# Patient Record
Sex: MALE | Race: BLACK OR AFRICAN AMERICAN
[De-identification: names, ages, dates, MRNs, and addresses within clinical notes are randomized per-mention and may not be internally consistent; named-entity substitution may affect disease eponyms.]

---

## 2020-08-10 ENCOUNTER — HOSPITAL ENCOUNTER (INPATIENT)
Dept: HOSPITAL 54 - ER | Age: 85
LOS: 4 days | Discharge: NURSING, REHAB OR LONG-TERM CARE | DRG: 564 | End: 2020-08-14
Attending: LEGAL MEDICINE | Admitting: LEGAL MEDICINE
Payer: MEDICARE

## 2020-08-10 VITALS — HEIGHT: 72 IN | WEIGHT: 156 LBS | BODY MASS INDEX: 21.13 KG/M2

## 2020-08-10 VITALS — SYSTOLIC BLOOD PRESSURE: 113 MMHG | DIASTOLIC BLOOD PRESSURE: 80 MMHG

## 2020-08-10 DIAGNOSIS — Z51.5: ICD-10-CM

## 2020-08-10 DIAGNOSIS — E11.69: ICD-10-CM

## 2020-08-10 DIAGNOSIS — Z91.19: ICD-10-CM

## 2020-08-10 DIAGNOSIS — Z79.4: ICD-10-CM

## 2020-08-10 DIAGNOSIS — E78.5: ICD-10-CM
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
## 2020-08-10 DIAGNOSIS — I25.10: ICD-10-CM

## 2020-08-10 DIAGNOSIS — E11.42: ICD-10-CM

## 2020-08-10 DIAGNOSIS — Z79.02: ICD-10-CM

## 2020-08-10 DIAGNOSIS — E11.22: ICD-10-CM

## 2020-08-10 DIAGNOSIS — E11.621: ICD-10-CM

## 2020-08-10 DIAGNOSIS — E11.628: ICD-10-CM

## 2020-08-10 DIAGNOSIS — L97.529: ICD-10-CM

## 2020-08-10 DIAGNOSIS — A41.9: ICD-10-CM

## 2020-08-10 DIAGNOSIS — E11.65: ICD-10-CM

## 2020-08-10 DIAGNOSIS — I12.9: ICD-10-CM

## 2020-08-10 DIAGNOSIS — N18.9: ICD-10-CM

## 2020-08-10 DIAGNOSIS — F32.9: ICD-10-CM

## 2020-08-10 DIAGNOSIS — J98.11: ICD-10-CM

## 2020-08-10 DIAGNOSIS — Z79.82: ICD-10-CM

## 2020-08-10 DIAGNOSIS — T87.44: Primary | ICD-10-CM

## 2020-08-10 DIAGNOSIS — M86.9: ICD-10-CM

## 2020-08-10 DIAGNOSIS — Z79.899: ICD-10-CM

## 2020-08-10 DIAGNOSIS — F41.9: ICD-10-CM
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
## 2020-08-10 LAB
ALBUMIN SERPL BCP-MCNC: 3.1 G/DL (ref 3.4–5)
ALP SERPL-CCNC: 76 U/L (ref 46–116)
ALT SERPL W P-5'-P-CCNC: 10 U/L (ref 12–78)
AST SERPL W P-5'-P-CCNC: 12 U/L (ref 15–37)
BASOPHILS # BLD AUTO: 0 /CMM (ref 0–0.2)
BASOPHILS NFR BLD AUTO: 0.3 % (ref 0–2)
BILIRUB DIRECT SERPL-MCNC: 0.1 MG/DL (ref 0–0.2)
BILIRUB SERPL-MCNC: 0.3 MG/DL (ref 0.2–1)
BUN SERPL-MCNC: 13 MG/DL (ref 7–18)
CALCIUM SERPL-MCNC: 8.8 MG/DL (ref 8.5–10.1)
CHLORIDE SERPL-SCNC: 100 MMOL/L (ref 98–107)
CO2 SERPL-SCNC: 28 MMOL/L (ref 21–32)
CREAT SERPL-MCNC: 0.8 MG/DL (ref 0.6–1.3)
EOSINOPHIL NFR BLD AUTO: 1.9 % (ref 0–6)
GLUCOSE SERPL-MCNC: 224 MG/DL (ref 74–106)
HCT VFR BLD AUTO: 34 % (ref 39–51)
HGB BLD-MCNC: 10.7 G/DL (ref 13.5–17.5)
LYMPHOCYTES NFR BLD AUTO: 1.3 /CMM (ref 0.8–4.8)
LYMPHOCYTES NFR BLD AUTO: 14.9 % (ref 20–44)
MCHC RBC AUTO-ENTMCNC: 32 G/DL (ref 31–36)
MCV RBC AUTO: 82 FL (ref 80–96)
MONOCYTES NFR BLD AUTO: 0.4 /CMM (ref 0.1–1.3)
MONOCYTES NFR BLD AUTO: 4.5 % (ref 2–12)
NEUTROPHILS # BLD AUTO: 7 /CMM (ref 1.8–8.9)
NEUTROPHILS NFR BLD AUTO: 78.4 % (ref 43–81)
PLATELET # BLD AUTO: 344 /CMM (ref 150–450)
POTASSIUM SERPL-SCNC: 4.5 MMOL/L (ref 3.5–5.1)
PROT SERPL-MCNC: 7.7 G/DL (ref 6.4–8.2)
RBC # BLD AUTO: 4.11 MIL/UL (ref 4.5–6)
SODIUM SERPL-SCNC: 135 MMOL/L (ref 136–145)
WBC NRBC COR # BLD AUTO: 9 K/UL (ref 4.3–11)

## 2020-08-10 PROCEDURE — C9113 INJ PANTOPRAZOLE SODIUM, VIA: HCPCS

## 2020-08-10 PROCEDURE — A6407 PACKING STRIPS, NON-IMPREG: HCPCS

## 2020-08-10 PROCEDURE — A6403 STERILE GAUZE>16 <= 48 SQ IN: HCPCS

## 2020-08-10 PROCEDURE — A6253 ABSORPT DRG > 48 SQ IN W/O B: HCPCS

## 2020-08-10 PROCEDURE — A4217 STERILE WATER/SALINE, 500 ML: HCPCS

## 2020-08-10 PROCEDURE — G0378 HOSPITAL OBSERVATION PER HR: HCPCS

## 2020-08-10 RX ADMIN — ATORVASTATIN CALCIUM SCH MG: 10 TABLET, FILM COATED ORAL at 22:35

## 2020-08-10 RX ADMIN — Medication SCH MG: at 22:36

## 2020-08-10 RX ADMIN — SODIUM CHLORIDE PRN MLS/HR: 9 INJECTION, SOLUTION INTRAVENOUS at 23:16

## 2020-08-10 RX ADMIN — INSULIN GLARGINE SCH UNIT: 100 INJECTION, SOLUTION SUBCUTANEOUS at 22:51

## 2020-08-10 RX ADMIN — Medication PRN EACH: at 23:21

## 2020-08-10 NOTE — NUR
pt transported to unit on [place] with emt and rn at bedside. using [facility] 
protocol. nad noted during transport.

## 2020-08-10 NOTE — NUR
MS RN NOTE

PAGED MD GOMEZ REGARDING PT REFUSING ALL CARE. REFUSING TO ANSWER THE ADMISSION 
QUESTIONS. STATES "I WILL DO TOMORROW MORNING, LEAVE ME ALONE AT THIS TIME. NURSE TYSON 
UNABLE TO DO WOUND ASSESSMENT AND ADMISSION QUESTIONNAIRES. WAITING FOR MD TO CALL BACK.
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
## 2020-08-10 NOTE — NUR
RN OPENING NOTE





PT RECEIVED IN STABLE CONDITION. PT REFUSES TO PROVIDE ANY INFORMATION AND HE REFUSES TO BE 
TOUCHED.HE STATED "GO OUT OF THE ROOM , I DO NOT WANT TO TALK TO YOU, I WANT A MALE NURSE, 
CLOSE THE DOOR, DO NOT COME BACK" CHARGE NURSE SONIA MADE AWARE.

## 2020-08-11 VITALS — DIASTOLIC BLOOD PRESSURE: 69 MMHG | SYSTOLIC BLOOD PRESSURE: 120 MMHG

## 2020-08-11 VITALS — SYSTOLIC BLOOD PRESSURE: 132 MMHG | DIASTOLIC BLOOD PRESSURE: 80 MMHG

## 2020-08-11 LAB
BASOPHILS # BLD AUTO: 0 /CMM (ref 0–0.2)
BASOPHILS NFR BLD AUTO: 0.6 % (ref 0–2)
BUN SERPL-MCNC: 12 MG/DL (ref 7–18)
CALCIUM SERPL-MCNC: 8.8 MG/DL (ref 8.5–10.1)
CHLORIDE SERPL-SCNC: 103 MMOL/L (ref 98–107)
CO2 SERPL-SCNC: 27 MMOL/L (ref 21–32)
CREAT SERPL-MCNC: 0.5 MG/DL (ref 0.6–1.3)
EOSINOPHIL NFR BLD AUTO: 5.2 % (ref 0–6)
GLUCOSE SERPL-MCNC: 132 MG/DL (ref 74–106)
HCT VFR BLD AUTO: 31 % (ref 39–51)
HGB BLD-MCNC: 9.9 G/DL (ref 13.5–17.5)
LYMPHOCYTES NFR BLD AUTO: 1.8 /CMM (ref 0.8–4.8)
LYMPHOCYTES NFR BLD AUTO: 28.6 % (ref 20–44)
MCHC RBC AUTO-ENTMCNC: 32 G/DL (ref 31–36)
MCV RBC AUTO: 81 FL (ref 80–96)
MONOCYTES NFR BLD AUTO: 0.5 /CMM (ref 0.1–1.3)
MONOCYTES NFR BLD AUTO: 7.3 % (ref 2–12)
NEUTROPHILS # BLD AUTO: 3.6 /CMM (ref 1.8–8.9)
NEUTROPHILS NFR BLD AUTO: 58.3 % (ref 43–81)
PLATELET # BLD AUTO: 293 /CMM (ref 150–450)
POTASSIUM SERPL-SCNC: 4.2 MMOL/L (ref 3.5–5.1)
RBC # BLD AUTO: 3.75 MIL/UL (ref 4.5–6)
SODIUM SERPL-SCNC: 136 MMOL/L (ref 136–145)
WBC NRBC COR # BLD AUTO: 6.2 K/UL (ref 4.3–11)

## 2020-08-11 RX ADMIN — GABAPENTIN SCH MG: 300 CAPSULE ORAL at 16:30

## 2020-08-11 RX ADMIN — Medication SCH ML: at 17:21

## 2020-08-11 RX ADMIN — GABAPENTIN SCH MG: 300 CAPSULE ORAL at 12:58

## 2020-08-11 RX ADMIN — Medication PRN EACH: at 15:20

## 2020-08-11 RX ADMIN — CLOPIDOGREL BISULFATE SCH MG: 75 TABLET, FILM COATED ORAL at 09:08

## 2020-08-11 RX ADMIN — ATORVASTATIN CALCIUM SCH MG: 10 TABLET, FILM COATED ORAL at 21:16

## 2020-08-11 RX ADMIN — SODIUM CHLORIDE SCH MG: 9 INJECTION, SOLUTION INTRAVENOUS at 09:08

## 2020-08-11 RX ADMIN — Medication SCH ML: at 12:54

## 2020-08-11 RX ADMIN — ENOXAPARIN SODIUM SCH MG: 40 INJECTION SUBCUTANEOUS at 21:00

## 2020-08-11 RX ADMIN — ENOXAPARIN SODIUM SCH MG: 40 INJECTION SUBCUTANEOUS at 21:21

## 2020-08-11 RX ADMIN — ATORVASTATIN CALCIUM SCH MG: 10 TABLET, FILM COATED ORAL at 22:00

## 2020-08-11 RX ADMIN — Medication SCH MG: at 09:08

## 2020-08-11 RX ADMIN — Medication SCH MG: at 22:00

## 2020-08-11 RX ADMIN — Medication SCH MG: at 21:16

## 2020-08-11 RX ADMIN — INSULIN GLARGINE SCH UNIT: 100 INJECTION, SOLUTION SUBCUTANEOUS at 22:00

## 2020-08-11 RX ADMIN — Medication SCH MG: at 16:30

## 2020-08-11 RX ADMIN — GABAPENTIN SCH MG: 300 CAPSULE ORAL at 09:08

## 2020-08-11 RX ADMIN — INSULIN LISPRO SCH UNIT: 100 INJECTION, SOLUTION INTRAVENOUS; SUBCUTANEOUS at 18:12

## 2020-08-11 RX ADMIN — INSULIN LISPRO SCH UNIT: 100 INJECTION, SOLUTION INTRAVENOUS; SUBCUTANEOUS at 08:15

## 2020-08-11 RX ADMIN — INSULIN LISPRO SCH UNIT: 100 INJECTION, SOLUTION INTRAVENOUS; SUBCUTANEOUS at 12:40

## 2020-08-11 NOTE — NUR
DR. GOMEZ SEEN PATIENT AWARE PATIENT IS DIFFICULT AND REFUSING VITALS AT TIMES AND ALSO 
PHOTO/TREATMENT.

## 2020-08-11 NOTE — NUR
WOUND CARE CONSULT: PT REFUSING SKIN ASSESSMENT AND WOUND PHOTOS. WILL SEE PT/REVIEW PHOTOS 
AS PT CONDITION PERMITS. UNIT DIRECTOR AND CHARGE RN AWARE. CURRENT CARMELO SCORE IS 18.

## 2020-08-11 NOTE — NUR
tried again peripheral iv 3x unsuccessful patient agreed to have midline tonite for his 
antibiotic per md. per emma nursing sup byron coming tonite to do midline. willl endorse to 
night shift.

## 2020-08-11 NOTE — NUR
RN NOTES,



PATIENT REFUSED NIGHT MEDICATIONS, REFUSED BLOOD SUGAR CHECK AND ADMINISTRATION OF LANTUS, 
EXPLAINED RISKS AND BENEFITS X3, STILL REFUSED, MD AWARE WILL CONTINUE TO MONITOR CLOSELY.

## 2020-08-11 NOTE — NUR
RN OPENING NOTES



RECEIVED PATIENT IN BED , A/O X 3 ON ROOM AIR %, NO S/S OF REPARATORY DISTRESS. NOTED 
PATIENT BEDSIDE COMMODE , LAC # 18 RUNNING NS @ 75 ML/HR. SAFETY MEASURE IN PLACE, BED 
LOCKED AND IN LOWEST POSITION, SIDE RAILS UP X 2 , CALL LIGHT WITHIN EASY REACH. WILL 
CONTINUE TO MONITOR.

## 2020-08-11 NOTE — NUR
RN CLOSING NOTES



WILL ENDORSE PT TO PM NURSE FOR JUANITA. PATIENT IS  IN BED , A/O X 3 ON ROOM AIR SAT %, 
NO S/S OF REPARATORY DISTRESS. NOTED PATIENT BEDSIDE COMMODE , PATIENT REMOVED  LAC # 18 
RUNNING NS @ 75 ML/HR. MULTIPLE ATTEMPTS TRIED FOR [facility] 
FOR MIDLINE NURSE TO COME IN AT 2300. PATIENT REFUSES PHOTOS TO BE TAKEN. CHARGE NURSE AND 
MD AWARE.   SAFETY MEASURE IN PLACE, BED LOCKED AND IN LOWEST POSITION, SIDE RAILS UP X 2 , 
CALL LIGHT WITHIN EASY REACH.

## 2020-08-11 NOTE — NUR
RN OPENING NOTES, 



PATIENT LYING ON BED  A/O X 3, ABLE TO VERBALIZED NEEDS AND CONCERNS , ON ROOM AIR  NO S/S 
OF REPARATORY DISTRESS,  NO IV ACESS AT THIS TIME, WILL HAVE MIDLINE PLACEMENT LATER,  ALL 
SAFETY MEASURES IN PLACE, BED LOCKED AND IN LOWEST POSITION, SIDE RAILS UP X 2 , CALL LIGHT 
WITHIN REACH, WILL CONTINUE TO MONITOR CLOSELY,.

## 2020-08-11 NOTE — NUR
RN NOTES



PATIENT REMOVED IV ACCESS LAC # 18. PATIENT IS DIFFICULT AND DOES NOT WANT TO BE POKED 
MULTIPLE TIMES. CHARGE NURSE MADE AWARE. PER CHARGE NURSE , MIDLINE NURSE WILL COME AT 2300.

## 2020-08-12 VITALS — SYSTOLIC BLOOD PRESSURE: 144 MMHG | DIASTOLIC BLOOD PRESSURE: 78 MMHG

## 2020-08-12 VITALS — DIASTOLIC BLOOD PRESSURE: 62 MMHG | SYSTOLIC BLOOD PRESSURE: 100 MMHG

## 2020-08-12 RX ADMIN — Medication SCH MG: at 21:00

## 2020-08-12 RX ADMIN — Medication SCH MG: at 22:00

## 2020-08-12 RX ADMIN — Medication SCH MG: at 08:43

## 2020-08-12 RX ADMIN — ATORVASTATIN CALCIUM SCH MG: 10 TABLET, FILM COATED ORAL at 21:00

## 2020-08-12 RX ADMIN — ATORVASTATIN CALCIUM SCH MG: 10 TABLET, FILM COATED ORAL at 22:00

## 2020-08-12 RX ADMIN — GABAPENTIN SCH MG: 300 CAPSULE ORAL at 13:18

## 2020-08-12 RX ADMIN — THERA TABS SCH UDTAB: TAB at 08:44

## 2020-08-12 RX ADMIN — Medication SCH ML: at 12:20

## 2020-08-12 RX ADMIN — INSULIN GLARGINE SCH UNIT: 100 INJECTION, SOLUTION SUBCUTANEOUS at 21:14

## 2020-08-12 RX ADMIN — ENOXAPARIN SODIUM SCH MG: 40 INJECTION SUBCUTANEOUS at 21:00

## 2020-08-12 RX ADMIN — Medication PRN EACH: at 15:28

## 2020-08-12 RX ADMIN — INSULIN LISPRO SCH UNIT: 100 INJECTION, SOLUTION INTRAVENOUS; SUBCUTANEOUS at 09:03

## 2020-08-12 RX ADMIN — INSULIN LISPRO SCH UNIT: 100 INJECTION, SOLUTION INTRAVENOUS; SUBCUTANEOUS at 18:20

## 2020-08-12 RX ADMIN — GABAPENTIN SCH MG: 300 CAPSULE ORAL at 08:44

## 2020-08-12 RX ADMIN — MEROPENEM SCH MLS/HR: 1 INJECTION INTRAVENOUS at 20:52

## 2020-08-12 RX ADMIN — GABAPENTIN SCH MG: 300 CAPSULE ORAL at 17:59

## 2020-08-12 RX ADMIN — CLOPIDOGREL BISULFATE SCH MG: 75 TABLET, FILM COATED ORAL at 08:44

## 2020-08-12 RX ADMIN — Medication SCH MG: at 17:00

## 2020-08-12 RX ADMIN — Medication SCH MG: at 08:44

## 2020-08-12 RX ADMIN — Medication SCH ML: at 09:03

## 2020-08-12 RX ADMIN — INSULIN LISPRO SCH UNIT: 100 INJECTION, SOLUTION INTRAVENOUS; SUBCUTANEOUS at 13:10

## 2020-08-12 RX ADMIN — SODIUM CHLORIDE SCH MG: 9 INJECTION, SOLUTION INTRAVENOUS at 09:00

## 2020-08-12 RX ADMIN — Medication SCH ML: at 17:27
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
## 2020-08-12 NOTE — NUR
RN CLOSING NOTES, 



PATIENT SLEEPING AT THIS TIME, BUR AROUSES EASILY TO VERBAL STIMULI, , ABLE TO VERBALIZED 
NEEDS AND CONCERNS , ON ROOM AIR  NO S/S OF REPARATORY DISTRESS,  NO IV ACCESS AT THIS TIME, 
REFUSED MIDLINE INSERTION LAST NIGHT, REFUSED MEDICATIONS, ACCUCHEK AND INSULIN, AND REFUSED 
VITAL SIGHS AT 0400, MARTHA AND CHARGE NURSE AWARE, OTHERWISE NO SIGNIFICANT CHANGE IN 
CONDITION DURING THE NIGHT, ALL SAFETY MEASURES IN PLACE, BED LOCKED AND IN LOWEST POSITION, 
SIDE RAILS UP X 2 , CALL LIGHT WITHIN REACH, WILL ENDORSE CONTINUITY OF CARE TO ONCOMING 
NURSE.
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
## 2020-08-12 NOTE — NUR
WOUND CARE: PT ALLOWED PHOTOS. RECOMMEND DPM CONSULT. DR FLORES NOTIFIED OF CONSULT 
REQUEST. DEFER TO DPM FOR WOUND TREATMENT PLAN. MD IN AGREEMENT WITH PLAN OF CARE.

## 2020-08-12 NOTE — NUR
RN NOTE



RECEIVED PT IN BED, AO X4, NO S/SX OF ACUTE DISTRESS AT THIS TIME. NO SOB NOTED. PATIENT'S 
BREATHING IS EVEN AND UNLABORED, SATURATING >95% ON ROOM AIR. NOTED IV SITE ON RAMONA MIDLINE; 
PATENT AND FLUSHING WELL,NO S/S OF INFECTION NOTED. PATIENT IS AMBULATORY, NEEDS MINIMAL 
ASSISTANCE. NOTED LEFT FOOT/L GREAT TOE WOUND, WITH WOUND DRESSING MODERATELY SATURATED. 
SAFETY MEASURES IMPLEMENTED PER PROTOCOL. HEAD OF BED ELEVATED. BED IS LOCKED,  IN LOWEST 
POSITION AND SIDE RAILS UP. CALL LIGHT WITHIN REACH OF THE PATIENT. WILL CONTINUE TO MONITOR 
AND REASSESS FOR ANY CHANGES.

## 2020-08-12 NOTE — NUR
RN NOTE



PATIENT REFUSED SCHEDULED MEDICATIONS DESPITRE EXPLAINING TO HIM THE IMPORTANCE OF FOLLOWING 
THE MEDICATION REGIMEN. SAID HE WANTED TO SLEEP FOR NOW. CHARGE RN MADE AWARE.

## 2020-08-12 NOTE — NUR
YVON RN OPENING NOTES

RECEIVED PT IN BED. PT IS ON RA SATURATING IN 97%. MED SURGE PT . ALERT AND ORIENTED X 3. 
TOOK PHOTO OF LEFT FOOT/ TOE . NO IV LINE. MIDLINE NURSE COMING @15:00.SAFETY MEASUREMENTS 
ARE IMPLEMENTED. BED IS IN THE LOWEST POSITION LOCKED AND BED RAILS ARE UP X2.CALL LIGHT 
WITHIN REACH. WILL CONTINUE TO MONITOR.

## 2020-08-12 NOTE — NUR
WOUND CARE: PT CONTINUES TO REFUSED WOUND ASSESSMENT AND PHOTOS. DR GOMEZ AWARE PER 
CHARGE RN. WILL SEE PT AS PT CONDITION PERMITS.

## 2020-08-12 NOTE — NUR
RN NOTES,



INFORMED MARTHA ON CALL THAT PATIENT REFUSED MIDLINE INSERTION AND THAT HE IS ON 
ANTIBIOTICS AND IV FLUIDS, AWARE AND REPLIED THAT WE CANNOT FORCE PATIENT AND HAS RIGHT TO 
REFUSE, WILL CONTINUE TO MONITOR CLOSELY.

## 2020-08-12 NOTE — NUR
RN NOTES,



PATIENT REFUSED VITAL SIGNS AT THIS TIME, EXPLAINED RISKS AND BENEFITS X3, STILL REFUSED. 
CHARGE NURSE AND MD AWARE.

## 2020-08-12 NOTE — NUR
RN NOTES, 



MIDLINE NURSE AT THE ROOM AT THIS TIME FOR MIDLINE INSERTION, PATIENT REFUSED, EXPLAINED 
RISKS AND BENEFITS AND EXPLAINED THAT HE IN ON ANTIBIOTICS AND IV FLUID, AND THE IMPORTANCE 
OF THESE MEDICATIONS, AND PATIENT STILL REFUSED. CHARGE NURSE AWARE. WILL INFORM MD.

## 2020-08-13 VITALS — DIASTOLIC BLOOD PRESSURE: 66 MMHG | SYSTOLIC BLOOD PRESSURE: 117 MMHG

## 2020-08-13 VITALS — DIASTOLIC BLOOD PRESSURE: 70 MMHG | SYSTOLIC BLOOD PRESSURE: 113 MMHG

## 2020-08-13 LAB
BUN SERPL-MCNC: 13 MG/DL (ref 7–18)
CALCIUM SERPL-MCNC: 8.9 MG/DL (ref 8.5–10.1)
CHLORIDE SERPL-SCNC: 103 MMOL/L (ref 98–107)
CO2 SERPL-SCNC: 28 MMOL/L (ref 21–32)
CREAT SERPL-MCNC: 0.9 MG/DL (ref 0.6–1.3)
GLUCOSE SERPL-MCNC: 197 MG/DL (ref 74–106)
POTASSIUM SERPL-SCNC: 4.1 MMOL/L (ref 3.5–5.1)
SODIUM SERPL-SCNC: 136 MMOL/L (ref 136–145)

## 2020-08-13 RX ADMIN — MEROPENEM SCH MLS/HR: 1 INJECTION INTRAVENOUS at 09:43

## 2020-08-13 RX ADMIN — ENOXAPARIN SODIUM SCH MG: 40 INJECTION SUBCUTANEOUS at 21:01

## 2020-08-13 RX ADMIN — SODIUM CHLORIDE PRN MLS/HR: 9 INJECTION, SOLUTION INTRAVENOUS at 09:43

## 2020-08-13 RX ADMIN — INSULIN GLARGINE SCH UNIT: 100 INJECTION, SOLUTION SUBCUTANEOUS at 21:50

## 2020-08-13 RX ADMIN — SODIUM CHLORIDE PRN MLS/HR: 9 INJECTION, SOLUTION INTRAVENOUS at 20:48

## 2020-08-13 RX ADMIN — CLOPIDOGREL BISULFATE SCH MG: 75 TABLET, FILM COATED ORAL at 08:48

## 2020-08-13 RX ADMIN — Medication SCH MG: at 08:48

## 2020-08-13 RX ADMIN — Medication PRN EACH: at 20:59

## 2020-08-13 RX ADMIN — INSULIN LISPRO SCH UNIT: 100 INJECTION, SOLUTION INTRAVENOUS; SUBCUTANEOUS at 12:39

## 2020-08-13 RX ADMIN — ATORVASTATIN CALCIUM SCH MG: 10 TABLET, FILM COATED ORAL at 20:58

## 2020-08-13 RX ADMIN — Medication SCH ML: at 16:49

## 2020-08-13 RX ADMIN — GABAPENTIN SCH MG: 300 CAPSULE ORAL at 08:48

## 2020-08-13 RX ADMIN — GABAPENTIN SCH MG: 300 CAPSULE ORAL at 12:59

## 2020-08-13 RX ADMIN — Medication SCH MG: at 16:49

## 2020-08-13 RX ADMIN — DEXTROSE MONOHYDRATE SCH MLS/HR: 50 INJECTION, SOLUTION INTRAVENOUS at 10:45

## 2020-08-13 RX ADMIN — MEROPENEM SCH MLS/HR: 1 INJECTION INTRAVENOUS at 20:57

## 2020-08-13 RX ADMIN — Medication SCH ML: at 08:00

## 2020-08-13 RX ADMIN — SODIUM CHLORIDE SCH MG: 9 INJECTION, SOLUTION INTRAVENOUS at 08:48

## 2020-08-13 RX ADMIN — Medication SCH ML: at 12:41

## 2020-08-13 RX ADMIN — Medication SCH MG: at 20:58

## 2020-08-13 RX ADMIN — INSULIN LISPRO SCH UNIT: 100 INJECTION, SOLUTION INTRAVENOUS; SUBCUTANEOUS at 08:42

## 2020-08-13 RX ADMIN — GABAPENTIN SCH MG: 300 CAPSULE ORAL at 12:40

## 2020-08-13 RX ADMIN — INSULIN LISPRO SCH UNIT: 100 INJECTION, SOLUTION INTRAVENOUS; SUBCUTANEOUS at 17:18

## 2020-08-13 RX ADMIN — THERA TABS SCH UDTAB: TAB at 08:48

## 2020-08-13 RX ADMIN — GABAPENTIN SCH MG: 300 CAPSULE ORAL at 16:49

## 2020-08-13 RX ADMIN — DEXTROSE MONOHYDRATE SCH MLS/HR: 50 INJECTION, SOLUTION INTRAVENOUS at 16:48

## 2020-08-13 NOTE — NUR
RN NOTE



PATIENT STATED HE DOESNT WANT TO BE BOTHERED FOR NOW, AS HE WANTS TO REST. STILL REFUSED 
SCHEDULED MEDICATIONS. CHARGE RN MADE AWARE. 

-------------------------------------------------------------------------------

Addendum: 08/13/20 at 0052 by YOUSUF BATEMAN RN

-------------------------------------------------------------------------------

PATIENT REFUSED PM MEDICATIONS INCLUDING VANCO TROUGH SUPPOSEDLY SCHEDULED 8/12/2020 0700. 
ON CALL DR TASHA LAGOS MADE AWARE. ORDERED TO GIVE NEXT DOSE OF VANCOMYCIN SCHEDULED AT 0600 
IF PATIENT AGREES.

## 2020-08-13 NOTE — NUR
MS RN NOTES

Inserted 22g PIV on LFA clean, intact, patent and flushing well with NS infusing at 75ml/hr. 
Patient tolerated well.

## 2020-08-13 NOTE — NUR
MS RN CLOSING NOTES

Patient resting in bed. A/O x 4. VS stable with no acute distress. Breathing even and 
unlabored on room air with no respiratory distress. Denies pain. No signs and symptoms of 
pain. 22g PIV on LFA clean, intact, patent and flushing well. Safety precautions in place. 
Bed locked and set to lowest position with side rails x 2 up. All needs rendered at this 
time. Call light within reach. Will endorse plan of care to oncoming shift.

## 2020-08-13 NOTE — NUR
was contacted by Marcie Tucker [phone] to forward the [facility] report 
[id number] regarding [facility]  to [facility] via fax 
[phone]. This SW completed this task.

## 2020-08-13 NOTE — NUR
MS RN OPENING NOTES

Received Patient resting in bed. A/O x 4. VS stable with no acute distress. Breathing even 
and unlabored on room air with no respiratory distress. Denies pain. No signs and symptoms 
of pain. RAMONA Midline pulled out. Patient in stable condition. Safety precautions in place. 
Bed locked and set to lowest position with side rails x 2 up. All needs rendered at this 
time. Call light within reach. Will continue to monitor.

## 2020-08-13 NOTE — NUR
RN NOTE



PATIENT REFUSED VITAL SIGNS TO BE TAKEN AT 08/13 0400.

-------------------------------------------------------------------------------

Addendum: 08/13/20 at 0431 by YOUSUF BATEMAN RN

-------------------------------------------------------------------------------

Amended: Links added.

## 2020-08-14 VITALS — DIASTOLIC BLOOD PRESSURE: 71 MMHG | SYSTOLIC BLOOD PRESSURE: 115 MMHG

## 2020-08-14 LAB
BUN SERPL-MCNC: 13 MG/DL (ref 7–18)
CALCIUM SERPL-MCNC: 8.5 MG/DL (ref 8.5–10.1)
CHLORIDE SERPL-SCNC: 102 MMOL/L (ref 98–107)
CO2 SERPL-SCNC: 32 MMOL/L (ref 21–32)
CREAT SERPL-MCNC: 0.8 MG/DL (ref 0.6–1.3)
GLUCOSE SERPL-MCNC: 190 MG/DL (ref 74–106)
POTASSIUM SERPL-SCNC: 4.3 MMOL/L (ref 3.5–5.1)
SODIUM SERPL-SCNC: 137 MMOL/L (ref 136–145)

## 2020-08-14 PROCEDURE — 05HA33Z INSERTION OF INFUSION DEVICE INTO LEFT BRACHIAL VEIN, PERCUTANEOUS APPROACH: ICD-10-PCS | Performed by: NURSE PRACTITIONER

## 2020-08-14 RX ADMIN — DEXTROSE MONOHYDRATE SCH MLS/HR: 50 INJECTION, SOLUTION INTRAVENOUS at 01:00

## 2020-08-14 RX ADMIN — CLOPIDOGREL BISULFATE SCH MG: 75 TABLET, FILM COATED ORAL at 08:13

## 2020-08-14 RX ADMIN — DEXTROSE MONOHYDRATE SCH MLS/HR: 50 INJECTION, SOLUTION INTRAVENOUS at 01:28

## 2020-08-14 RX ADMIN — GABAPENTIN SCH MG: 300 CAPSULE ORAL at 13:00

## 2020-08-14 RX ADMIN — Medication SCH MG: at 08:13

## 2020-08-14 RX ADMIN — INSULIN LISPRO SCH UNIT: 100 INJECTION, SOLUTION INTRAVENOUS; SUBCUTANEOUS at 13:00

## 2020-08-14 RX ADMIN — THERA TABS SCH UDTAB: TAB at 08:13

## 2020-08-14 RX ADMIN — INSULIN LISPRO SCH UNIT: 100 INJECTION, SOLUTION INTRAVENOUS; SUBCUTANEOUS at 07:30

## 2020-08-14 RX ADMIN — Medication PRN EACH: at 12:59

## 2020-08-14 RX ADMIN — Medication SCH ML: at 12:54

## 2020-08-14 RX ADMIN — Medication SCH ML: at 07:46

## 2020-08-14 RX ADMIN — SODIUM CHLORIDE PRN MLS/HR: 9 INJECTION, SOLUTION INTRAVENOUS at 06:09

## 2020-08-14 RX ADMIN — GABAPENTIN SCH MG: 300 CAPSULE ORAL at 08:13

## 2020-08-14 NOTE — NUR
RN NOTES



PATIENT BLOOD SUGAR 139, PATIENT REFUSING 4 UNITS OF INSULIN LISPRO/ASPART. EDUCATED THE 
PATIENT THE RISKS AND BENEFITS OF INSULIN. PATIENT KEPT REFUSING AFTER MULTIPLE ATTEMPTS. 
WILL CONTINUE TO MONITOR PATIENT.

## 2020-08-14 NOTE — NUR
RN NOTES



INFORMED DR. GOMEZ ABOUT PATIENT'S PRESENTING WITH BILATERAL DEEP VENOUS THROMBOSIS. NO 
NEW ORDERS AT THIS TIME. WILL CONTINUE WITH PLAN OF CARE AND CONTINUE TO MONITOR PATIENT.
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
## 2020-08-14 NOTE — NUR
RN notes



Received pateient awake in bed with no distress noted. Alert and oriented, verbally able to 
communicate needs. Complaint of left foot pain, Norco given x 1. Patient refused IV fluid 
hydration and refused IV antibiotic. Also refused to have wound care. Explained risk and 
benefits x 3 but still refused infusion. Needs attended, kept clean and dry. Will continue 
to monitor.

## 2020-08-14 NOTE — NUR
TELE RN NOTES



PATIENT RECEIVED IN BED. ALERT AND ORIENTED X 3. PATIENT ON ROOM AIR WITH NO SIGNS OF 
RESPIRATORY DISTRESS AT THIS TIME.  NO IV ACCESS DUE TO PATIENT REMOVING IT, EXPLAINED 
BENEFITS, PATIENT STILL REFUSED. PATIENT PRESENTS NO PAIN OR DISCOMFORT AT THIS TIME. SAFETY 
PRECAUTIONS IMPLEMENTED WITH BED LOCKED, BED IN THE LOWEST POSITION, BILATERAL SIDE RAILS 
UP, AND CALL LIGHT WITHIN EASY REACH OF THE PATIENT. WILL CONTINUE TO MONITOR PATIENT.

## 2020-08-14 NOTE — NUR
DISCHARGE NOTES 



PATIENT AWAKE, ALERT AND ORIENTED. MEDICALLY CLEARED AND STABLE FOR DISCHARGE BY MD. ON ROOM 
AIR WITH NO SIGNS OF RESPIRATORY DISTRESS, NO SIGNS SOB NOTED, AND WITH EVEN NON-LABORED 
BREATHING. PATIENT REFUSED SKIN ASSESSMENT TO BE DONE. PATIENT ACCOUNTED FOR ALL OF HIS 
BELONGINGS. REMOVED ID BAND FROM PATIENT. NO IV ACCESS ON PATIENT. PROVIDED EDUCATION TO 
PATIENT ON EXIT CARE AND TO FOLLOW UP WITH VASCULAR DR AS RECOMMENDED BY PEDIATRY. PATIENT 
LEFT UNIT VIA GURNEY WITH EMT.

## 2020-08-20 NOTE — NUR
10am

SW received a voicemail from Khanh Chung call [phone]. This SW returned Khanh Chung call [phone]. Khanh is an investigator with the [facility] 
regarding the [employer] report regarding [facility]. Khanh asked this SW to 
confirm the patients demographics including date of birth and correct spelling of the 
patients name. Khanh asked this SW to provide the phone number to [facility], 
[phone]. Khanh stated he will move forward with the investigation and will reach out 
to this SW if he needs anything else. 





This SW to remain available during this investigation regarding the patient.

## 2020-08-25 NOTE — NUR
10:20am:

This SW received a call from Marcie Tucker [phone] from [facility]. Marcie 
wanted to confirm new placement of this patient. This SW provided the new placement 
information for this patient at [facility] [phone], [address] [place] [address] . 

This SW to remain available during the ongoing investigation.

## 2023-04-07 NOTE — NUR
RN NOTE



NOTED PATIENT HAD BOWEL MOVEMENT, BUT REFUSED TO BE CLEANED AND CHANGED. SAID HE WANTED A 
LADY TO TAKECARE OF HER, SPECIFICALLY NICCI RN, AND WILL WAIT UNTIL MORNING. Alert and oriented to person, place and time/Symptoms improved